# Patient Record
(demographics unavailable — no encounter records)

---

## 2024-10-16 NOTE — HISTORY OF PRESENT ILLNESS
[Mother] : mother [FreeTextEntry7] : 6yo for well exam no concerns,  [de-identified] : minimal vegetables, will try to add one vegetable by next year,

## 2024-10-16 NOTE — DISCUSSION/SUMMARY
[] : The components of the vaccine(s) to be administered today are listed in the plan of care. The disease(s) for which the vaccine(s) are intended to prevent and the risks have been discussed with the caretaker.  The risks are also included in the appropriate vaccination information statements which have been provided to the patient's caregiver.  The caregiver has given consent to vaccinate. 4 = No assist / stand by assistance

## 2025-06-27 NOTE — REVIEW OF SYSTEMS
[Fever] : fever [Headache] : headache [Myalgia] : myalgia [Negative] : Genitourinary [Sore Throat] : sore throat

## 2025-06-27 NOTE — DISCUSSION/SUMMARY
[FreeTextEntry1] : # fever # COVID - strep-; throat culture will be sent out - supportive care advised: fever/pain control, hydration, etc.  - RTC for persistent high fever (longer than 3 days), breathing difficulty, poor oral intake, decreased UOP, or any new concerns/symptoms

## 2025-06-27 NOTE — PHYSICAL EXAM
[Erythematous Oropharynx] : erythematous oropharynx [Capillary Refill <2s] : capillary refill < 2s [NL] : warm, clear [FreeTextEntry1] : [facility]15

## 2025-06-27 NOTE — HISTORY OF PRESENT ILLNESS
[de-identified] : fever [FreeTextEntry6] : - p/w fever Tmax 39.4C a/w headache, back pain, leg pain, neck pain, sore throat and leg pain - maintains good appetite - not sure if related with his swimming for 3 days - denies cough congestion emesis diarrhea

## 2025-06-27 NOTE — END OF VISIT
[Time Spent: ___ minutes] : I have spent [unfilled] minutes of time on the encounter which excludes teaching and separately reported services. 125

## 2025-06-27 NOTE — HISTORY OF PRESENT ILLNESS
[de-identified] : fever [FreeTextEntry6] : - p/w fever Tmax 39.4C a/w headache, back pain, leg pain, neck pain, sore throat and leg pain - maintains good appetite - not sure if related with his swimming for 3 days - denies cough congestion emesis diarrhea